# Patient Record
Sex: FEMALE | Race: WHITE | NOT HISPANIC OR LATINO | Employment: FULL TIME | URBAN - METROPOLITAN AREA
[De-identification: names, ages, dates, MRNs, and addresses within clinical notes are randomized per-mention and may not be internally consistent; named-entity substitution may affect disease eponyms.]

---

## 2023-09-04 PROBLEM — R63.2 BINGE EATING: Status: ACTIVE | Noted: 2023-09-04

## 2023-09-04 PROBLEM — E78.2 MIXED HYPERLIPIDEMIA: Status: ACTIVE | Noted: 2023-09-04

## 2023-09-04 PROBLEM — K80.10 CHRONIC CHOLECYSTITIS WITH CALCULUS: Status: ACTIVE | Noted: 2023-09-04

## 2023-09-04 PROBLEM — F32.A DEPRESSION: Status: ACTIVE | Noted: 2023-09-04

## 2023-09-04 PROBLEM — R53.82 CHRONIC FATIGUE: Status: ACTIVE | Noted: 2023-09-04

## 2023-09-04 PROBLEM — E66.01 MORBID OBESITY (MULTI): Status: ACTIVE | Noted: 2023-09-04

## 2023-09-04 PROBLEM — G43.909 MIGRAINE: Status: ACTIVE | Noted: 2023-09-04

## 2023-09-04 PROBLEM — G47.9 SLEEP DISTURBANCE: Status: ACTIVE | Noted: 2023-09-04

## 2023-09-04 PROBLEM — N91.2 AMENORRHEA: Status: ACTIVE | Noted: 2023-09-04

## 2023-09-04 PROBLEM — E78.49 OTHER HYPERLIPIDEMIA: Status: ACTIVE | Noted: 2023-09-04

## 2023-09-04 PROBLEM — N93.0 POSTCOITAL BLEEDING: Status: ACTIVE | Noted: 2023-09-04

## 2023-09-04 PROBLEM — N92.6 IRREGULAR MENSTRUAL CYCLE: Status: ACTIVE | Noted: 2023-09-04

## 2023-09-04 RX ORDER — RIZATRIPTAN BENZOATE 10 MG/1
10 TABLET ORAL
COMMUNITY
Start: 2022-06-13 | End: 2024-05-08 | Stop reason: WASHOUT

## 2023-09-04 RX ORDER — NORETHINDRONE 5 MG/1
5 TABLET ORAL DAILY
COMMUNITY
End: 2024-05-08 | Stop reason: WASHOUT

## 2023-09-04 RX ORDER — LORATADINE 10 MG/1
10 TABLET ORAL DAILY
COMMUNITY

## 2023-09-08 PROBLEM — E78.49 OTHER HYPERLIPIDEMIA: Status: RESOLVED | Noted: 2023-09-04 | Resolved: 2023-09-08

## 2023-12-12 ENCOUNTER — TELEPHONE (OUTPATIENT)
Dept: PRIMARY CARE | Facility: CLINIC | Age: 33
End: 2023-12-12
Payer: COMMERCIAL

## 2023-12-12 DIAGNOSIS — J06.9 ACUTE URI: Primary | ICD-10-CM

## 2023-12-12 DIAGNOSIS — J02.9 ACUTE PHARYNGITIS, UNSPECIFIED ETIOLOGY: ICD-10-CM

## 2023-12-12 RX ORDER — AZITHROMYCIN 250 MG/1
TABLET, FILM COATED ORAL
Qty: 6 TABLET | Refills: 0 | Status: SHIPPED | OUTPATIENT
Start: 2023-12-12 | End: 2023-12-15 | Stop reason: SDUPTHER

## 2023-12-12 NOTE — TELEPHONE ENCOUNTER
Jane pt.  Pt c/o sore throat, runny nose, headache, cough with light green mucus x2 days.  Has tried tylenol and dayquil without relief.  States  was just diagnosed with URI and asking if you could Rx Zpak.  Please advise.  Ph: [phone]

## 2023-12-12 NOTE — TELEPHONE ENCOUNTER
Patient advised to use Mucinex one twice a day with full  glass water, and try Flonase nasal spray as directed, informed patient I would pass along message for possible antibiotic

## 2023-12-13 DIAGNOSIS — J02.9 ACUTE PHARYNGITIS, UNSPECIFIED ETIOLOGY: ICD-10-CM

## 2023-12-13 DIAGNOSIS — J06.9 ACUTE URI: ICD-10-CM

## 2023-12-13 NOTE — TELEPHONE ENCOUNTER
Rx for Zpak was sent to mail order pharm.  Please send to local pharm, [facility] [address] [place] [address].

## 2023-12-15 NOTE — TELEPHONE ENCOUNTER
Pharm cannot take verbal and asking if this can be escribed.  Please send Zpak to [address].    This was originally sent to mail order pharm in error.

## 2023-12-16 RX ORDER — AZITHROMYCIN 250 MG/1
TABLET, FILM COATED ORAL
Qty: 6 TABLET | Refills: 0 | Status: SHIPPED | OUTPATIENT
Start: 2023-12-16 | End: 2023-12-20

## 2023-12-21 ENCOUNTER — APPOINTMENT (OUTPATIENT)
Dept: PRIMARY CARE | Facility: CLINIC | Age: 33
End: 2023-12-21
Payer: COMMERCIAL

## 2024-04-18 ENCOUNTER — APPOINTMENT (OUTPATIENT)
Dept: PRIMARY CARE | Facility: CLINIC | Age: 34
End: 2024-04-18
Payer: COMMERCIAL

## 2024-05-08 ENCOUNTER — LAB (OUTPATIENT)
Dept: LAB | Facility: LAB | Age: 34
End: 2024-05-08
Payer: COMMERCIAL

## 2024-05-08 ENCOUNTER — OFFICE VISIT (OUTPATIENT)
Dept: PRIMARY CARE | Facility: CLINIC | Age: 34
End: 2024-05-08
Payer: COMMERCIAL

## 2024-05-08 ENCOUNTER — DOCUMENTATION (OUTPATIENT)
Dept: SURGERY | Facility: HOSPITAL | Age: 34
End: 2024-05-08

## 2024-05-08 ENCOUNTER — TELEPHONE (OUTPATIENT)
Dept: PRIMARY CARE | Facility: CLINIC | Age: 34
End: 2024-05-08

## 2024-05-08 VITALS
SYSTOLIC BLOOD PRESSURE: 125 MMHG | HEART RATE: 92 BPM | OXYGEN SATURATION: 96 % | DIASTOLIC BLOOD PRESSURE: 86 MMHG | WEIGHT: 293 LBS | HEIGHT: 68 IN | BODY MASS INDEX: 44.41 KG/M2

## 2024-05-08 DIAGNOSIS — E78.2 MIXED HYPERLIPIDEMIA: ICD-10-CM

## 2024-05-08 DIAGNOSIS — E28.2 PCOS (POLYCYSTIC OVARIAN SYNDROME): ICD-10-CM

## 2024-05-08 DIAGNOSIS — E66.01 MORBID OBESITY (MULTI): ICD-10-CM

## 2024-05-08 DIAGNOSIS — R53.82 CHRONIC FATIGUE: ICD-10-CM

## 2024-05-08 DIAGNOSIS — G43.001 MIGRAINE WITHOUT AURA AND WITH STATUS MIGRAINOSUS, NOT INTRACTABLE: ICD-10-CM

## 2024-05-08 DIAGNOSIS — R73.03 PRE-DIABETES: ICD-10-CM

## 2024-05-08 DIAGNOSIS — N92.6 IRREGULAR MENSTRUAL CYCLE: ICD-10-CM

## 2024-05-08 DIAGNOSIS — Z76.89 ENCOUNTER TO ESTABLISH CARE: Primary | ICD-10-CM

## 2024-05-08 DIAGNOSIS — R63.2 BINGE EATING: ICD-10-CM

## 2024-05-08 LAB
25(OH)D3 SERPL-MCNC: 25 NG/ML (ref 30–100)
ALBUMIN SERPL BCP-MCNC: 4.2 G/DL (ref 3.4–5)
ALP SERPL-CCNC: 89 U/L (ref 33–110)
ALT SERPL W P-5'-P-CCNC: 25 U/L (ref 7–45)
ANION GAP SERPL CALC-SCNC: 13 MMOL/L (ref 10–20)
AST SERPL W P-5'-P-CCNC: 15 U/L (ref 9–39)
BASOPHILS # BLD AUTO: 0.05 X10*3/UL (ref 0–0.1)
BASOPHILS NFR BLD AUTO: 0.6 %
BILIRUB SERPL-MCNC: 0.4 MG/DL (ref 0–1.2)
BUN SERPL-MCNC: 14 MG/DL (ref 6–23)
CALCIUM SERPL-MCNC: 9.4 MG/DL (ref 8.6–10.3)
CHLORIDE SERPL-SCNC: 104 MMOL/L (ref 98–107)
CHOLEST SERPL-MCNC: 202 MG/DL (ref 0–199)
CHOLESTEROL/HDL RATIO: 4.8
CO2 SERPL-SCNC: 26 MMOL/L (ref 21–32)
CREAT SERPL-MCNC: 0.65 MG/DL (ref 0.5–1.05)
EGFRCR SERPLBLD CKD-EPI 2021: >90 ML/MIN/1.73M*2
EOSINOPHIL # BLD AUTO: 0.32 X10*3/UL (ref 0–0.7)
EOSINOPHIL NFR BLD AUTO: 4.1 %
ERYTHROCYTE [DISTWIDTH] IN BLOOD BY AUTOMATED COUNT: 13.1 % (ref 11.5–14.5)
EST. AVERAGE GLUCOSE BLD GHB EST-MCNC: 100 MG/DL
GLUCOSE SERPL-MCNC: 116 MG/DL (ref 74–99)
HBA1C MFR BLD: 5.1 %
HCT VFR BLD AUTO: 40 % (ref 36–46)
HDLC SERPL-MCNC: 42.5 MG/DL
HGB BLD-MCNC: 13.2 G/DL (ref 12–16)
IMM GRANULOCYTES # BLD AUTO: 0.02 X10*3/UL (ref 0–0.7)
IMM GRANULOCYTES NFR BLD AUTO: 0.3 % (ref 0–0.9)
IRON SATN MFR SERPL: 16 % (ref 25–45)
IRON SERPL-MCNC: 52 UG/DL (ref 35–150)
LDLC SERPL CALC-MCNC: 141 MG/DL
LYMPHOCYTES # BLD AUTO: 2.16 X10*3/UL (ref 1.2–4.8)
LYMPHOCYTES NFR BLD AUTO: 27.9 %
MAGNESIUM SERPL-MCNC: 1.97 MG/DL (ref 1.6–2.4)
MCH RBC QN AUTO: 28.6 PG (ref 26–34)
MCHC RBC AUTO-ENTMCNC: 33 G/DL (ref 32–36)
MCV RBC AUTO: 87 FL (ref 80–100)
MONOCYTES # BLD AUTO: 0.48 X10*3/UL (ref 0.1–1)
MONOCYTES NFR BLD AUTO: 6.2 %
NEUTROPHILS # BLD AUTO: 4.7 X10*3/UL (ref 1.2–7.7)
NEUTROPHILS NFR BLD AUTO: 60.9 %
NON HDL CHOLESTEROL: 160 MG/DL (ref 0–149)
NRBC BLD-RTO: 0 /100 WBCS (ref 0–0)
PLATELET # BLD AUTO: 374 X10*3/UL (ref 150–450)
POTASSIUM SERPL-SCNC: 4.4 MMOL/L (ref 3.5–5.3)
PROT SERPL-MCNC: 6.7 G/DL (ref 6.4–8.2)
RBC # BLD AUTO: 4.62 X10*6/UL (ref 4–5.2)
SODIUM SERPL-SCNC: 139 MMOL/L (ref 136–145)
T4 FREE SERPL-MCNC: 0.91 NG/DL (ref 0.61–1.12)
TIBC SERPL-MCNC: 335 UG/DL (ref 240–445)
TRIGL SERPL-MCNC: 94 MG/DL (ref 0–149)
TSH SERPL-ACNC: 2.69 MIU/L (ref 0.44–3.98)
UIBC SERPL-MCNC: 283 UG/DL (ref 110–370)
VIT B12 SERPL-MCNC: 328 PG/ML (ref 211–911)
VLDL: 19 MG/DL (ref 0–40)
WBC # BLD AUTO: 7.7 X10*3/UL (ref 4.4–11.3)

## 2024-05-08 PROCEDURE — 84439 ASSAY OF FREE THYROXINE: CPT

## 2024-05-08 PROCEDURE — 99204 OFFICE O/P NEW MOD 45 MIN: CPT | Performed by: NURSE PRACTITIONER

## 2024-05-08 PROCEDURE — 82607 VITAMIN B-12: CPT

## 2024-05-08 PROCEDURE — 83540 ASSAY OF IRON: CPT

## 2024-05-08 PROCEDURE — 80053 COMPREHEN METABOLIC PANEL: CPT

## 2024-05-08 PROCEDURE — 83735 ASSAY OF MAGNESIUM: CPT

## 2024-05-08 PROCEDURE — 80061 LIPID PANEL: CPT

## 2024-05-08 PROCEDURE — 36415 COLL VENOUS BLD VENIPUNCTURE: CPT

## 2024-05-08 PROCEDURE — 84443 ASSAY THYROID STIM HORMONE: CPT

## 2024-05-08 PROCEDURE — 85025 COMPLETE CBC W/AUTO DIFF WBC: CPT

## 2024-05-08 PROCEDURE — 83550 IRON BINDING TEST: CPT

## 2024-05-08 PROCEDURE — 82306 VITAMIN D 25 HYDROXY: CPT

## 2024-05-08 PROCEDURE — 83036 HEMOGLOBIN GLYCOSYLATED A1C: CPT

## 2024-05-08 PROCEDURE — 1036F TOBACCO NON-USER: CPT | Performed by: NURSE PRACTITIONER

## 2024-05-08 RX ORDER — SEMAGLUTIDE 0.25 MG/.5ML
0.25 INJECTION, SOLUTION SUBCUTANEOUS
Qty: 2 ML | Refills: 1 | Status: SHIPPED | OUTPATIENT
Start: 2024-05-12 | End: 2024-06-10 | Stop reason: ALTCHOICE

## 2024-05-08 NOTE — PATIENT INSTRUCTIONS
Call your insurance and ask if they cover GLP-1 for weight loss. If they say yes- ask what your responsibility is  Ozempic, mounjaro, wegovy, zepbound

## 2024-05-08 NOTE — PROGRESS NOTES
"Subjective   Patient ID: Jodi Wesley is a 34 y.o. female who presents for New Patient Visit (Patient is here today to establish care with a new pcp. Patient had a hard time getting her medications ather last pcp. Patient is in the process of trying to get pregnant and has been going through her OB with Fertility. ) and Med Refill.    34 year old female here to establish care. Was a pt of [employer]. Was having a hard time getting refills of RX. Scheduled appt with a new PCP who retired. Then saw Argenis Curran.   Had a physical for health insurance 10/2023  Seeing GYN for fertility.  fertility specialist Janet in July 2024  GYN is Samara Tejeda. Screenings have been done in 2022. PCOS, uterine US, labs, uterine BX  Labs reviewed- pre diabetes, high nml TSH  Weight- fluctuating. Heaviest was 420lb. Then started to lose weight. Has a friend who is a  and was helping with diet, weight loss.    Recent cholecystectomy 2023. Was not able to maintain the weight loss afterward.   HX Migraine- was encouraged to wean and stop the meds for migraine since 12/2024. Not taking anything at this time. Takes tylenol. Migraine occurs 1 time a week. Occasionally vidal have a migraine for 3 days in a row  Has been having a constant \"period\" since February. Some days very heavy. Using a menstrual cup. Has not alerted GYN.  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   ETOH- none  Nicotene- none  Exercise- walk the dogs. Trying elliptical bike. Cannot do more than 10 min at a time.       Med Refill         Review of Systems    Objective   /86   Pulse 92   Ht 1.727 m (5' 8\")   Wt (!) 183 kg (404 lb)   SpO2 96%   BMI 61.43 kg/m²     Physical Exam  Constitutional:       Appearance: She is obese.   HENT:      Head: Normocephalic and atraumatic.   Cardiovascular:      Rate and Rhythm: Normal rate and regular rhythm.      Pulses: Normal pulses.      Heart sounds: Normal " heart sounds.   Pulmonary:      Effort: Pulmonary effort is normal.      Breath sounds: Normal breath sounds.   Abdominal:      Palpations: Abdomen is soft.   Musculoskeletal:         General: Normal range of motion.      Cervical back: Normal range of motion and neck supple.   Skin:     General: Skin is warm and dry.      Comments: Tattoo LT UE   Neurological:      General: No focal deficit present.      Mental Status: She is alert and oriented to person, place, and time.   Psychiatric:         Mood and Affect: Mood normal.         Behavior: Behavior normal.         Assessment/Plan   Diagnoses and all orders for this visit:  Encounter to establish care  Comments:  Update fasting lipids.  I spent a great deal of time educating her on morbid obesity and fertility  Binge eating  Comments:  I think she would benefit from GLP-1 injections however insurance may not pay.  Referral placed for bariatric surgery  Orders:  -     Referral to Bariatric Surgery; Future  Chronic fatigue  Comments:  Multiple possible causes.  Her weight is one of them prediabetes is another elevated blood sugar is also a cause  Orders:  -     Vitamin B12; Future  -     Vitamin D 25-Hydroxy,Total (for eval of Vitamin D levels); Future  -     Thyroid Stimulating Hormone; Future  -     Thyroxine, Free; Future  Irregular menstrual cycle  Comments:  PCOS.  I asked that she call her gynecologist to let her know she has been having it.  Since February that is 3 months  Orders:  -     Iron and TIBC; Future  -     Vitamin B12; Future  -     CBC and Auto Differential; Future  -     Thyroid Stimulating Hormone; Future  -     Thyroxine, Free; Future  Morbid obesity (Multi)  Comments:  Need weight loss.  I discussed risk of ongoing morbid obesity on her body organs.  She is trying to get pregnant losing weight can be very beneficial  Orders:  -     Vitamin B12; Future  -     Vitamin D 25-Hydroxy,Total (for eval of Vitamin D levels); Future  -     Comprehensive  Metabolic Panel; Future  -     Thyroid Stimulating Hormone; Future  -     Thyroxine, Free; Future  -     Referral to Clinical Pharmacy; Future  -     Referral to Bariatric Surgery; Future  Mixed hyperlipidemia  Comments:  Update fasting lipid panel  Orders:  -     Lipid Panel; Future  Migraine without aura and with status migrainosus, not intractable  Comments:  Untreated due to fear of complications with medication side effects and pregnancy if she should conceive.  Orders:  -     Vitamin B12; Future  -     Vitamin D 25-Hydroxy,Total (for eval of Vitamin D levels); Future  -     Magnesium; Future  -     Comprehensive Metabolic Panel; Future  -     Thyroid Stimulating Hormone; Future  -     Thyroxine, Free; Future  Pre-diabetes  Comments:  Update hemoglobin A1c.  I am hoping her insurance covers GLP-1 inhibitors so we can get her on a medication to prevent type 2 diabetes  Orders:  -     Comprehensive Metabolic Panel; Future  -     Hemoglobin A1C; Future  -     Referral to Clinical Pharmacy; Future  -     Referral to Bariatric Surgery; Future  Other orders  -     Follow Up In Primary Care - Established; Future

## 2024-05-08 NOTE — PROGRESS NOTES
Received vcml from pt in the [facility]OR[facility]ERT[facility] box, and she was inquiring on how to get scheduled for a consult for non-surgical and surgical weight loss. NeedFeed message was sent.

## 2024-05-09 ENCOUNTER — APPOINTMENT (OUTPATIENT)
Dept: PRIMARY CARE | Facility: CLINIC | Age: 34
End: 2024-05-09
Payer: COMMERCIAL

## 2024-05-09 ENCOUNTER — TELEPHONE (OUTPATIENT)
Dept: SURGERY | Facility: CLINIC | Age: 34
End: 2024-05-09

## 2024-05-09 ENCOUNTER — TELEMEDICINE (OUTPATIENT)
Dept: PRIMARY CARE | Facility: CLINIC | Age: 34
End: 2024-05-09
Payer: COMMERCIAL

## 2024-05-09 DIAGNOSIS — E53.8 B12 DEFICIENCY: ICD-10-CM

## 2024-05-09 DIAGNOSIS — E55.9 VITAMIN D DEFICIENCY: ICD-10-CM

## 2024-05-09 DIAGNOSIS — E28.2 PCOS (POLYCYSTIC OVARIAN SYNDROME): ICD-10-CM

## 2024-05-09 DIAGNOSIS — E66.01 MORBID OBESITY (MULTI): Primary | ICD-10-CM

## 2024-05-09 DIAGNOSIS — R73.03 PRE-DIABETES: ICD-10-CM

## 2024-05-09 PROCEDURE — 99213 OFFICE O/P EST LOW 20 MIN: CPT | Performed by: NURSE PRACTITIONER

## 2024-05-09 NOTE — PROGRESS NOTES
Subjective   Patient ID: Jodi Wesley is a 34 y.o. female who presents for No chief complaint on file..    This is a virtual visit with video.  Patient has requested and is agreeable to be on video. I performed this visit using real-time telehealth tools; including audio/video connection between the pt. and I at [facility].        This was scheduled in person however she could not make it so we did virtual to educate her on Wegovy. I educated on the drug class, poss SE, mode of action.  No HX pancreatitis- does not drink ETOH.  No personal or family HX medullary thyroid cancer.   On video I showed her where to inject.   We talked about nausea and constipation. Paying attention to healthy foods.   I still want her to meet with Bariatrics. She agrees.  She is encouraged to reach out to me for any questions.          Review of Systems    Objective   There were no vitals taken for this visit.    Physical Exam    Assessment/Plan   Diagnoses and all orders for this visit:  Morbid obesity (Multi)  Comments:  wegovy 0.25mg sq q week x 1 month then see me for eval. keep lowest most effective dose  Pre-diabetes  PCOS (polycystic ovarian syndrome)  B12 deficiency  Comments:  add B12 1000mcg sl qd  Vitamin D deficiency  Comments:  add Vitamin D 3-5,000 iu po daily with food  Other orders  -     Follow Up In Primary Care - Established; Future

## 2024-06-10 ENCOUNTER — OFFICE VISIT (OUTPATIENT)
Dept: PRIMARY CARE | Facility: CLINIC | Age: 34
End: 2024-06-10
Payer: COMMERCIAL

## 2024-06-10 VITALS
HEIGHT: 68 IN | BODY MASS INDEX: 44.41 KG/M2 | WEIGHT: 293 LBS | SYSTOLIC BLOOD PRESSURE: 123 MMHG | DIASTOLIC BLOOD PRESSURE: 84 MMHG | OXYGEN SATURATION: 98 % | HEART RATE: 79 BPM

## 2024-06-10 DIAGNOSIS — E28.2 PCOS (POLYCYSTIC OVARIAN SYNDROME): ICD-10-CM

## 2024-06-10 DIAGNOSIS — R73.03 PRE-DIABETES: ICD-10-CM

## 2024-06-10 DIAGNOSIS — E66.01 MORBID OBESITY (MULTI): Primary | ICD-10-CM

## 2024-06-10 PROCEDURE — 1036F TOBACCO NON-USER: CPT | Performed by: NURSE PRACTITIONER

## 2024-06-10 PROCEDURE — 99213 OFFICE O/P EST LOW 20 MIN: CPT | Performed by: NURSE PRACTITIONER

## 2024-06-10 RX ORDER — SEMAGLUTIDE 0.5 MG/.5ML
0.5 INJECTION, SOLUTION SUBCUTANEOUS
Qty: 2 ML | Refills: 3 | Status: SHIPPED | OUTPATIENT
Start: 2024-06-10

## 2024-06-10 ASSESSMENT — LIFESTYLE VARIABLES
TOBACCO_USE: NO
HISTORY_ALCOHOL_USE: NO

## 2024-06-10 NOTE — PROGRESS NOTES
"Subjective   Patient ID: Jodi Wesley is a 34 y.o. female who presents for Follow-up (Patient is here today for her 1 month follow up. ).    34 year old female here for follow up weight loss on Wegovy, nausea and feel full faster. Has not been weighing herself. Injecting low abdomen. Feel less bloated. Her menstrual bleeding has slowed down when prior to  starting the Wegovy was having consistent bleeding. She is feeling better, her clothes are looser.   Taking Wegovy 0.25 mg. Has 3 more injections for this month  Was in touch with bariatrics- consultation in July   Will be seeing clinical pharmacist this week for follow up  Fertility OV in July         Review of Systems    Objective   /84   Pulse 79   Ht 1.727 m (5' 8\")   Wt (!) 184 kg (406 lb)   SpO2 98%   BMI 61.73 kg/m²     Physical Exam  Constitutional:       Appearance: Normal appearance. She is obese.   HENT:      Head: Normocephalic and atraumatic.   Pulmonary:      Effort: Pulmonary effort is normal.   Musculoskeletal:         General: Normal range of motion.   Neurological:      General: No focal deficit present.      Mental Status: She is alert and oriented to person, place, and time.   Psychiatric:         Mood and Affect: Mood normal.         Behavior: Behavior normal.         Assessment/Plan   Diagnoses and all orders for this visit:  Morbid obesity (Multi)  Comments:  weigh self at home.. incraese the Wegovy to 0.5 mg sq q week  Orders:  -     semaglutide, weight loss, (Wegovy) 0.5 mg/0.5 mL pen injector; Inject 0.5 mg under the skin 1 (one) time per week.  Pre-diabetes  -     semaglutide, weight loss, (Wegovy) 0.5 mg/0.5 mL pen injector; Inject 0.5 mg under the skin 1 (one) time per week.  PCOS (polycystic ovarian syndrome)  Comments:  MS are  becoming more regular  Orders:  -     semaglutide, weight loss, (Wegovy) 0.5 mg/0.5 mL pen injector; Inject 0.5 mg under the skin 1 (one) time per week.  Other orders  -     Follow Up In " Primary Care - Established

## 2024-06-13 ENCOUNTER — APPOINTMENT (OUTPATIENT)
Dept: PHARMACY | Facility: HOSPITAL | Age: 34
End: 2024-06-13
Payer: COMMERCIAL

## 2024-07-18 ENCOUNTER — HOSPITAL ENCOUNTER (OUTPATIENT)
Dept: RADIOLOGY | Facility: EXTERNAL LOCATION | Age: 34
Discharge: HOME | End: 2024-07-18

## 2024-07-18 DIAGNOSIS — M25.571 RIGHT ANKLE PAIN, UNSPECIFIED CHRONICITY: ICD-10-CM

## 2024-07-19 ENCOUNTER — APPOINTMENT (OUTPATIENT)
Dept: SURGERY | Facility: CLINIC | Age: 34
End: 2024-07-19
Payer: COMMERCIAL

## 2024-07-22 ENCOUNTER — APPOINTMENT (OUTPATIENT)
Dept: ORTHOPEDIC SURGERY | Facility: CLINIC | Age: 34
End: 2024-07-22
Payer: COMMERCIAL

## 2024-07-26 ENCOUNTER — TELEPHONE (OUTPATIENT)
Dept: ENDOCRINOLOGY | Facility: CLINIC | Age: 34
End: 2024-07-26

## 2024-07-26 ENCOUNTER — CONSULT (OUTPATIENT)
Dept: ENDOCRINOLOGY | Facility: CLINIC | Age: 34
End: 2024-07-26
Payer: COMMERCIAL

## 2024-07-26 VITALS
BODY MASS INDEX: 41.95 KG/M2 | DIASTOLIC BLOOD PRESSURE: 83 MMHG | WEIGHT: 293 LBS | HEART RATE: 81 BPM | HEIGHT: 70 IN | SYSTOLIC BLOOD PRESSURE: 136 MMHG

## 2024-07-26 DIAGNOSIS — Z83.2 FAMILY HISTORY OF BLEEDING OR CLOTTING DISORDER: ICD-10-CM

## 2024-07-26 DIAGNOSIS — N97.9 FEMALE INFERTILITY: ICD-10-CM

## 2024-07-26 DIAGNOSIS — Z13.71 SCREENING FOR GENETIC DISEASE CARRIER STATUS: ICD-10-CM

## 2024-07-26 DIAGNOSIS — N93.9 ABNORMAL UTERINE BLEEDING: Primary | ICD-10-CM

## 2024-07-26 DIAGNOSIS — N91.3 PRIMARY OLIGOMENORRHEA: ICD-10-CM

## 2024-07-26 DIAGNOSIS — Z31.41 FERTILITY TESTING: ICD-10-CM

## 2024-07-26 DIAGNOSIS — E66.01 MORBID OBESITY WITH BMI OF 50.0-59.9, ADULT (MULTI): ICD-10-CM

## 2024-07-26 DIAGNOSIS — Z11.3 SCREENING FOR STDS (SEXUALLY TRANSMITTED DISEASES): ICD-10-CM

## 2024-07-26 DIAGNOSIS — Z11.59 ENCOUNTER FOR SCREENING FOR OTHER VIRAL DISEASES: ICD-10-CM

## 2024-07-26 DIAGNOSIS — Z13.1 SCREENING FOR DIABETES MELLITUS: ICD-10-CM

## 2024-07-26 DIAGNOSIS — Z13.29 SCREENING FOR THYROID DISORDER: ICD-10-CM

## 2024-07-26 LAB
DHEA-S SERPL-MCNC: 334 UG/DL (ref 12–379)
PROGEST SERPL-MCNC: 0.5 NG/ML
PROLACTIN SERPL-MCNC: 6 UG/L (ref 3–20)
RUBV IGG SERPL IA-ACNC: 2.1 IA
RUBV IGG SERPL QL IA: POSITIVE
VARICELLA ZOSTER IGG INDEX: 5.3 IA
VZV IGG SER QL IA: POSITIVE

## 2024-07-26 PROCEDURE — 86787 VARICELLA-ZOSTER ANTIBODY: CPT

## 2024-07-26 PROCEDURE — 84144 ASSAY OF PROGESTERONE: CPT

## 2024-07-26 PROCEDURE — 83516 IMMUNOASSAY NONANTIBODY: CPT

## 2024-07-26 PROCEDURE — 84146 ASSAY OF PROLACTIN: CPT

## 2024-07-26 PROCEDURE — 86317 IMMUNOASSAY INFECTIOUS AGENT: CPT

## 2024-07-26 PROCEDURE — 36415 COLL VENOUS BLD VENIPUNCTURE: CPT

## 2024-07-26 PROCEDURE — 99215 OFFICE O/P EST HI 40 MIN: CPT | Performed by: OBSTETRICS & GYNECOLOGY

## 2024-07-26 PROCEDURE — 84402 ASSAY OF FREE TESTOSTERONE: CPT

## 2024-07-26 PROCEDURE — 99205 OFFICE O/P NEW HI 60 MIN: CPT | Performed by: OBSTETRICS & GYNECOLOGY

## 2024-07-26 PROCEDURE — 82627 DEHYDROEPIANDROSTERONE: CPT

## 2024-07-26 PROCEDURE — 83498 ASY HYDROXYPROGESTERONE 17-D: CPT

## 2024-07-26 RX ORDER — NORETHINDRONE 5 MG/1
5 TABLET ORAL DAILY
Qty: 90 TABLET | Refills: 3 | Status: SHIPPED | OUTPATIENT
Start: 2024-07-26 | End: 2025-07-26

## 2024-07-26 ASSESSMENT — PAIN SCALES - GENERAL: PAINLEVEL: 0-NO PAIN

## 2024-07-26 ASSESSMENT — COLUMBIA-SUICIDE SEVERITY RATING SCALE - C-SSRS
2. HAVE YOU ACTUALLY HAD ANY THOUGHTS OF KILLING YOURSELF?: NO
6. HAVE YOU EVER DONE ANYTHING, STARTED TO DO ANYTHING, OR PREPARED TO DO ANYTHING TO END YOUR LIFE?: NO
1. IN THE PAST MONTH, HAVE YOU WISHED YOU WERE DEAD OR WISHED YOU COULD GO TO SLEEP AND NOT WAKE UP?: NO

## 2024-07-26 ASSESSMENT — PATIENT HEALTH QUESTIONNAIRE - PHQ9
2. FEELING DOWN, DEPRESSED OR HOPELESS: NOT AT ALL
SUM OF ALL RESPONSES TO PHQ9 QUESTIONS 1 AND 2: 0
1. LITTLE INTEREST OR PLEASURE IN DOING THINGS: NOT AT ALL

## 2024-07-26 NOTE — PROGRESS NOTES
NEW FERTILITY PATIENT VISIT    Referred by: Dr. Tejeda  Accompanied today by: spouse      Jodi Wesley is a 34 y.o.  female who presents with   Infertility      Relationship Status:    x 2 years  Together x 8 years    Partner has azoospermia following treatment for cancer- does have some sperm stored at [facility]  Currently on Testosterone managed by Dr. WALKER    Patient has PCOS diagnosed 2 years ago based on oligomenorrhea/abnormal bleeding, clinical hirsutism    -Did have negative endometrial biopsy in 2022    However, currently has had persistent bleeding since 2024  Not currently on any hormonal management  Has been off of OCP x 1 year (2023)    OB Hx     OB History          0    Para   0    Term   0       0    AB   0    Living   0         SAB   0    IAB   0    Ectopic   0    Multiple   0    Live Births   0                 MENSTRUAL HISTORY:   Menarche:  14  Contraception:  OCPs, none currently  -Did try Nexplanon  Cycle length:  Irregular (regular in college)  Bleeding length: prolonged   Flow :  Average  and Heavy   Dysmenorrhea: Yes- mild    ENDOCRINE HISTORY  Nipple Discharge: No  Vision changes: No  Headaches: Yes- chronic migraines  Excess hair growth: Yes  -Chin  Acne: Yes  Oily skin:No  Recent weight change: Yes  -Started gaining weight at age 20, gained ~ 100 lbs in 2 years  -Has steadily gained weight since then  -Working on weight loss - Taking Wegovy x 3 months  -Lost ~ 10 lbs so far  Significant exercise history: No  History of eating disorder: No    PRIOR EVALUATION / TREATMENT  Endometrial biopsy   Prior Labs     Latest Reference Range & Units Most Recent   Free Thyroxine Index 1.1 - 4.2 UG/DL 2.7  18 13:43   FOLLICLE STIMULATING HORMONE MU/ML 5.6  19 10:45   Hemoglobin A1C see below % 5.1  24 10:20   Thyroxine, Free 0.61 - 1.12 ng/dL 0.91  24 10:20   T3 Uptake 25 - 35 % 26.6  18 13:43   PROLACTIN 4.8 - 23.3 NG/ML 14.3  19  10:45   Thyroid Stimulating Hormone 0.44 - 3.98 mIU/L 2.69  5/8/24 10:20   Vitamin D, 25-Hydroxy, Total 30 - 100 ng/mL 25 (L)  5/8/24 10:20   DHEA Sulfate  349.2  Reference range: 98.8 to 340.0  Unit: ug/dL    Reference ranges are age and gender specific. For additional   information, reference range tables can be found in the   laboratory test directory.  The normal values are based on the following source:   Dehydroepiandrosterone sulfate (DHEA S) [package insert V   17.0 English]. [facility], [place], [place]: August 2013.  Performed at the [facility] unless   otherwise noted.   (H)  11/12/19 10:45   Thyroxine 4.5 - 12.0 UG/DL 10.3  5/2/18 13:43   GLUCOSE 74 - 99 mg/dL 116 (H)  5/8/24 10:20   Estimated Average Glucose Not Established mg/dL 100  5/8/24 10:20   Is the patient fasting?  YES  Performed at [facility],[address] [facility] [address] [address]    12/15/22 09:19   (L): Data is abnormally low  (H): Data is abnormally high    Hysterosalpingogram: None  Saline Infused Sonography: None  GYN Pelvic Ultrasound: None    Prior Treatment:   None  On Wegovy for weight loss     GYN HISTORY    History of STD or PID: No  LMP: Patient's last menstrual period was 02/11/2024.  Last pap smear:  2022 negative  History of abnormal paps: No  History of abnormal mammogram: No  Date of last Mammogram :  N/A  Coitus: varies  x/fertile week    Pain with intercourse, bowel movements or full bladder: No     Pelvic pain: No    PMH  Past Medical History:   Diagnosis Date    Polycystic ovary syndrome    -Obesity, BMI=57     MEDICATIONS  Current Outpatient Medications on File Prior to Visit   Medication Sig Dispense Refill    loratadine (Claritin) 10 mg tablet Take 1 tablet (10 mg) by mouth once daily.      MULTIVITAMIN ORAL Take 1 tablet by mouth once daily.      semaglutide, weight loss, (Wegovy) 0.5 mg/0.5 mL pen injector Inject 0.5 mg under the skin 1 (one) time per week. 2 mL 3     No current  facility-administered medications on file prior to visit.       PSH  Past Surgical History:   Procedure Laterality Date    GALLBLADDER SURGERY  2023        PSYCH HISTORY  Past psych history: Yes Depression, bipolar II, anxiety  Prior hospitalization for mental health disorder: No     SOCIAL HISTORY  Occupation: [employer]  Social History     Tobacco Use    Smoking status: Never     Passive exposure: Never    Smokeless tobacco: Never   Substance Use Topics    Alcohol use: Not Currently     Comment: Stopped in October    Drug use: Never     History of incarceration: No  History of domestic violence: No  History of  incest or rape: No      FAMILY HISTORY   Family History   Problem Relation Name Age of Onset    Thyroid disease Mother      Blood clot Mother      Thyroid disease Paternal Grandmother       Family History of Blood Clots: Yes - Mother had DVT- thought to be hereditary, also may have been related to HRT  Family history of breast, ovary, colon, endometrial cancer: No    PARTNER HISTORY    Partner: Name: Yong Wesley  : 1990  Occupation: [employer]  Prior fertility history: none  PMH: Non-Hodgkin's Lymphoma, 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    ASSESSMENT   34 y.o.  female with  primary infertility x 2 years, suspected oligoovulation and the following pertinent medical issues: Obesity with BMI of 57, abnormal uterine bleeding with constant bleeding x 6 months, negative biopsy in .  PCOS based on oligomenorrhea and clinical hirsutism  Partner SA: Azoospermia- s/p cancer treatment and BMT    COUNSELING  We discussed causes of infertility including hormonal, egg quality issues, structural problems such as endometriosis, adhesions, or tubal problems, uterine factors such as polyps or fibroids, and sperm issues. Reviewed evaluation of such as well. We discussed various methods for achieving pregnancy in some detail including, ovulation induction, insemination, superovulation and IVF.    We discussed diagnosis of PCOS and implications for fertility and long-term health including risks of endometrial hyperplasia, obesity, diabetes and cardiovascular disease. Discussed diet and exercise are first-line treatments for PCOS. Medical management may be indicated, particularly for glucose intolerance if that is found in testing. Ovulation induction is the primary treatment for fertility.  Discussed the importance of diet and exercise as first-line treatments for PCOS and particularly the importance of a low-glycemic index diet that emphasizes whole grains, vegetables and fruits, and protein sources while minimizing sugar and processed foods. Discussed that even a small amount of weight loss can have an effect on the symptoms of PCOS and is important for long-term health outcomes.      We discussed the impact of obesity on fertility and pregnancy outcomes, including increased risk of miscarriage, gestational diabetes, preeclampsia, IUGR, and still birth.  We discussed the importance of weight loss for " optimizing fertility and pregnancy outcomes.       Discussed best option for conception would be IVF given partner's azoospermia and 1 vial of frozen sperm.  Discussed donor sperm as an option.  Discussed main christian to IVF currently is patient's weight- will need significant weight loss to achieve BMI <45 and recommend patient consider bariatric surgery- she has visit scheduled.    Discussed IVF as option and briefly reviewed what IVF involves (i.e. use of fertility drugs, egg retrieval, embryo transfer) and discussed IVF is treatment as well as diagnostic testing.    Discussed need for evaluation of patient's abnormal uterine bleeding and options for bleeding control- progesterone-only pill vs. IUD would be best options.  Has family history of blood clots- has never been assessed, does not know specific gene mutation.     PLAN  Orders Placed This Encounter   Procedures    Endometrial biopsy    US pelvis transvaginal    Antimullerian Hormone (Amh)    Prolactin    Testosterone,Free and Total    Dhea-Sulfate    17-Hydroxyprogesterone    Progesterone    Myriad Foresight Carrier Screen    Varicella Zoster Antibody, Igg    Rubella Antibody, Igg    Factor V Leiden       GENETIC SCREENING PATIENT  Ordered    PARTNER  Yes Semen Analysis: Completed previous  Yes Genetic screening: Ordered    FOLLOW UP   Consults:  Patient will continue to work with MD for medical weight loss, has consult to discuss bariatric surgery  Chart to primary nurse for care coordination and patient check list/education  Enroll in Engaged MD  Take prenatal vitamins, vitamin D 2000 IUs daily  Discussed that PAP and mammogram must be updated if appropriate based on age and clinical history and results received before treatment can begin  Discussed that treatment cannot proceed until checklist items are complete   6 week follow up with MD  Additional testing for BMI < 18 or > 40:  Will check later when closer to fertility treatment initiation  Sperm  Donor:  Will defer currently- couple would like to use partner sperm for IVF first.     Plan:  1.Schedule TVUS and endometrial biopsy- okay to do while bleeding  2. Start Aygestin 5 mg daily after biopsy for bleeding control--can consider Slynd in the future, not currently covered by her insurance  3. Continue Wegovy and keep visit with bariatric surgery to plan weight loss options  4. Check Factor V leiden given family history  5. Follow up partner's sperm freeze records, he can continue management with Dr. WALKER  6. RTC in 6 weeks to review PCOS management while attempting weight loss  7. Defer additional preconception workup (including STDS and MFM consult) pending weight loss and will get closer to actual treatment time  8. Will need IVF consult to discuss IVF in more detail- schedule in ~ 1 year      Ashley Gonzales MD

## 2024-07-26 NOTE — TELEPHONE ENCOUNTER
Returned patient call regarding start of Aygestin. Patient instructed she is to start Aygestin after her EMB per note from today from Dr. Gonzales. Patient inquiring when she should have EMB and TVUS. Patient instructed schedule both today as she is okay per note to schedule while bleeding. Patient inquiring about use of numbing cream for procedure d/t pain with previous procedure. Patient instructed to send my chart message inquiring about cream and stating when EMB and TVUS are scheduled. Patient agreeable and denies further questions/concerns. Patient transferred to front to schedule appointment.   NANCY JHA on 7/26/24 at 3:42 PM.

## 2024-07-26 NOTE — TELEPHONE ENCOUNTER
Attempted to return patient call. Detailed VM left for patient. Patient instructed to call office to discuss questions/concerns.   NANCY JHA on 7/26/24 at 2:35 PM.

## 2024-07-26 NOTE — PATIENT INSTRUCTIONS
ASSESSMENT   34 y.o.  female with  primary infertility x 2 years, suspected oligoovulation and the following pertinent medical issues: Obesity with BMI of 57, abnormal uterine bleeding with constant bleeding x 6 months, negative biopsy in .  Partner SA: Azoospermia- s/p cancer treatment and BMT    COUNSELING  We discussed causes of infertility including hormonal, egg quality issues, structural problems such as endometriosis, adhesions, or tubal problems, uterine factors such as polyps or fibroids, and sperm issues. Reviewed evaluation of such as well. We discussed various methods for achieving pregnancy in some detail including, ovulation induction, insemination, superovulation and IVF.    We discussed diagnosis of PCOS and implications for fertility and long-term health including risks of endometrial hyperplasia, obesity, diabetes and cardiovascular disease. Discussed diet and exercise are first-line treatments for PCOS. Medical management may be indicated, particularly for glucose intolerance if that is found in testing. Ovulation induction is the primary treatment for fertility.  Discussed the importance of diet and exercise as first-line treatments for PCOS and particularly the importance of a low-glycemic index diet that emphasizes whole grains, vegetables and fruits, and protein sources while minimizing sugar and processed foods. Discussed that even a small amount of weight loss can have an effect on the symptoms of PCOS and is important for long-term health outcomes.      We discussed the impact of obesity on fertility and pregnancy outcomes, including increased risk of miscarriage, gestational diabetes, preeclampsia, IUGR, and still birth.  We discussed the importance of weight loss for optimizing fertility and pregnancy outcomes.       Discussed best option for conception would be IVF given partner's azoospermia and 1 vial of frozen sperm.  Discussed donor egg as an option.  Discussed main christian  to IVF currently is patient's weight- will need significant weight loss to achieve BMI <45 and recommend patient consider bariatric surgery- she has visit scheduled.    Discussed IVF as option and briefly reviewed what IVF involves (i.e. use of fertility drugs, egg retrieval, embryo transfer) and discussed IVF is treatment as well as diagnostic testing.      PLAN  Orders Placed This Encounter   Procedures    Endometrial biopsy    US pelvis transvaginal    Antimullerian Hormone (Amh)    Prolactin    Testosterone,Free and Total    Dhea-Sulfate    17-Hydroxyprogesterone    Progesterone    Myriad Foresight Carrier Screen    Varicella Zoster Antibody, Igg    Rubella Antibody, Igg    Factor V Leiden       GENETIC SCREENING PATIENT  Ordered    PARTNER  Yes Semen Analysis: Completed previous  Yes Genetic screening: Ordered    FOLLOW UP   Consults: Patient will continue to work with MD for medical weight loss, has consult to discuss bariatric surgery  Chart to primary nurse for care coordination and patient check list/education  Enroll in Engaged MD  Take prenatal vitamins, vitamin D 2000 IUs daily  Discussed that PAP and mammogram must be updated if appropriate based on age and clinical history and results received before treatment can begin  Discussed that treatment cannot proceed until checklist items are complete   6 week follow up with MD  Additional testing for BMI < 18 or > 40: Will check later when closer to fertility treatment initiation  Sperm Donor:  Will defer currently- couple would like to use partner sperm for IVF first.     Plan:  1.Schedule TVUS and endometrial biopsy- okay to do while bleeding  2. Start Aygestin 5 mg daily after biopsy for bleeding control  3. Continue Wegovy and keep visit with bariatric surgery to plan weight loss options  4. Check Factor V leiden given family history  5. Follow up partner's sperm freeze records, he can continue management with Dr. AARON Oquendo [facility] in 6 weeks to review PCOS  management while attempting weight loss  7. Defer additional preconception workup (including STDS and MFM consult) pending weight loss and will get closer to actual treatment time  8. Will need IVF consult to discuss IVF in more detail- schedule in ~ 1 year      Ashley Gonzales MD

## 2024-07-30 LAB
MIS SERPL-MCNC: 13.19 NG/ML (ref 0.18–11.71)
TESTOSTERONE FREE (CHAN): 6 PG/ML (ref 0.1–6.4)
TESTOSTERONE,TOTAL,LC-MS/MS: 38 NG/DL (ref 2–45)

## 2024-08-01 LAB — 17OHP SERPL-MCNC: 16.44 NG/DL

## 2024-08-05 LAB
ELECTRONICALLY SIGNED BY: NORMAL
FACTOR V LEIDEN INTERPRETATION: NORMAL
FACTOR V LEIDEN RESULT: NORMAL

## 2024-08-07 ENCOUNTER — PREP FOR PROCEDURE (OUTPATIENT)
Dept: ENDOCRINOLOGY | Facility: CLINIC | Age: 34
End: 2024-08-07

## 2024-08-07 ENCOUNTER — ANCILLARY PROCEDURE (OUTPATIENT)
Dept: ENDOCRINOLOGY | Facility: CLINIC | Age: 34
End: 2024-08-07
Payer: COMMERCIAL

## 2024-08-07 ENCOUNTER — APPOINTMENT (OUTPATIENT)
Dept: ENDOCRINOLOGY | Facility: CLINIC | Age: 34
End: 2024-08-07
Payer: COMMERCIAL

## 2024-08-07 ENCOUNTER — HOSPITAL ENCOUNTER (OUTPATIENT)
Dept: ENDOCRINOLOGY | Facility: CLINIC | Age: 34
Discharge: HOME | End: 2024-08-07
Payer: COMMERCIAL

## 2024-08-07 VITALS
OXYGEN SATURATION: 96 % | SYSTOLIC BLOOD PRESSURE: 134 MMHG | DIASTOLIC BLOOD PRESSURE: 75 MMHG | TEMPERATURE: 97.7 F | HEART RATE: 73 BPM | WEIGHT: 293 LBS | RESPIRATION RATE: 16 BRPM | BODY MASS INDEX: 41.95 KG/M2 | HEIGHT: 70 IN

## 2024-08-07 DIAGNOSIS — N93.9 ABNORMAL UTERINE BLEEDING (AUB): Primary | ICD-10-CM

## 2024-08-07 DIAGNOSIS — N93.9 ABNORMAL UTERINE BLEEDING: ICD-10-CM

## 2024-08-07 LAB — PREGNANCY TEST URINE, POC: NEGATIVE

## 2024-08-07 PROCEDURE — 64435 NJX AA&/STRD PARACRV NRV: CPT | Performed by: OBSTETRICS & GYNECOLOGY

## 2024-08-07 PROCEDURE — 76830 TRANSVAGINAL US NON-OB: CPT

## 2024-08-07 PROCEDURE — 7100000010 HC PHASE TWO TIME - EACH INCREMENTAL 1 MINUTE: Performed by: OBSTETRICS & GYNECOLOGY

## 2024-08-07 PROCEDURE — 58100 BIOPSY OF UTERUS LINING: CPT | Performed by: OBSTETRICS & GYNECOLOGY

## 2024-08-07 PROCEDURE — 81025 URINE PREGNANCY TEST: CPT | Performed by: STUDENT IN AN ORGANIZED HEALTH CARE EDUCATION/TRAINING PROGRAM

## 2024-08-07 PROCEDURE — 7100000009 HC PHASE TWO TIME - INITIAL BASE CHARGE: Performed by: OBSTETRICS & GYNECOLOGY

## 2024-08-07 PROCEDURE — 76830 TRANSVAGINAL US NON-OB: CPT | Performed by: OBSTETRICS & GYNECOLOGY

## 2024-08-07 RX ORDER — ACETAMINOPHEN 325 MG/1
650 TABLET ORAL ONCE AS NEEDED
Status: DISCONTINUED | OUTPATIENT
Start: 2024-08-07 | End: 2024-08-08 | Stop reason: HOSPADM

## 2024-08-07 RX ORDER — OXYCODONE AND ACETAMINOPHEN 5; 325 MG/1; MG/1
1 TABLET ORAL EVERY 6 HOURS PRN
Status: DISCONTINUED | OUTPATIENT
Start: 2024-08-07 | End: 2024-08-08 | Stop reason: HOSPADM

## 2024-08-07 RX ORDER — ACETAMINOPHEN 325 MG/1
650 TABLET ORAL ONCE AS NEEDED
Status: CANCELLED | OUTPATIENT
Start: 2024-08-07

## 2024-08-07 RX ORDER — OXYCODONE AND ACETAMINOPHEN 5; 325 MG/1; MG/1
1 TABLET ORAL EVERY 6 HOURS PRN
Status: CANCELLED | OUTPATIENT
Start: 2024-08-07

## 2024-08-07 RX ORDER — ONDANSETRON HYDROCHLORIDE 2 MG/ML
4 INJECTION, SOLUTION INTRAVENOUS AS NEEDED
Status: DISCONTINUED | OUTPATIENT
Start: 2024-08-07 | End: 2024-08-08 | Stop reason: HOSPADM

## 2024-08-07 RX ORDER — ONDANSETRON HYDROCHLORIDE 2 MG/ML
4 INJECTION, SOLUTION INTRAVENOUS AS NEEDED
Status: CANCELLED | OUTPATIENT
Start: 2024-08-07

## 2024-08-07 ASSESSMENT — PAIN SCALES - GENERAL: PAINLEVEL_OUTOF10: 0 - NO PAIN

## 2024-08-07 ASSESSMENT — PAIN - FUNCTIONAL ASSESSMENT: PAIN_FUNCTIONAL_ASSESSMENT: 0-10

## 2024-08-07 NOTE — PROGRESS NOTES
Patient ID: Jodi Wesley is a 34 y.o. female.      Date/Time: 8/7/2024 11:44 AM    Performed by: Susan Gallardo MD  Authorized by: Susan Gallardo MD    Consent:     Consent obtained:  Written and verbal    Consent given by:  Patient    Risks, benefits, and alternatives were discussed: yes      Risks discussed:  Bleeding, infection and pain    Alternatives discussed:  No treatment  Universal protocol:     Procedure explained and questions answered to patient or proxy's satisfaction: yes      Relevant documents present and verified: yes      Test results available: yes      Imaging studies available: yes      Immediately prior to procedure, a time out was called: yes      Patient identity confirmed:  Verbally with patient and arm band  Pre-procedure details:     Skin preparation:  Povidone-iodine    Preparation: Patient was prepped and draped in the usual sterile fashion    Procedure specific details:      Preoperative Diagnosis: ABNORMAL UTERINE BLEEDING  Postoperative Diagnosis: Same  Assistant: NONE       Procedure: endometrial biopsy    Discussed risks, benefits, alternatives and potential complications of surgical management. Written Informed Consent was obtained prior to the procedure and is detailed in the patient's record. Urine pregnancy test was performed and was negative. Time out was performed.    Anesthesia: None  Paracervical block with 1% lidocaine: Yes  Tenaculum: Yes  Cervical dilation: No  Estimated Blood Loss: 5 cc  Specimens: endometrial biopsy    Findings: significant amount of endometrial tissue and blood      PROCEDURE DETAILS:   Patient was taken to the operating room.  She was prepped and draped in the usual sterile fashion in dorsal lithotomy position. A speculum was placed within the vaginal canal and the cervix was visualized. Betadine swabs x 3 were used to cleanse the cervix. A single tooth tenaculum was placed on the anterior lip of the cervix after lidocaine injection. Two  passes were performed with the Pipelle. Tenaculum and speculum were removed. Patient tolerated the procedure well and was returned to the recovery room in stable condition.  All counts were correct x 2. The attending physician was present for the entire procedure.  Susan Gallardo 08/07/24 11:44 AM          Post-procedure details:     Procedure completion:  Tolerated well, no immediate complications  Susan Gallardo 08/07/24 11:46 AM

## 2024-08-08 ENCOUNTER — APPOINTMENT (OUTPATIENT)
Dept: PRIMARY CARE | Facility: CLINIC | Age: 34
End: 2024-08-08
Payer: COMMERCIAL

## 2024-08-08 VITALS
OXYGEN SATURATION: 96 % | HEIGHT: 70 IN | SYSTOLIC BLOOD PRESSURE: 124 MMHG | WEIGHT: 293 LBS | BODY MASS INDEX: 41.95 KG/M2 | DIASTOLIC BLOOD PRESSURE: 84 MMHG | HEART RATE: 79 BPM

## 2024-08-08 DIAGNOSIS — E28.2 PCOS (POLYCYSTIC OVARIAN SYNDROME): ICD-10-CM

## 2024-08-08 DIAGNOSIS — G43.001 MIGRAINE WITHOUT AURA AND WITH STATUS MIGRAINOSUS, NOT INTRACTABLE: ICD-10-CM

## 2024-08-08 DIAGNOSIS — E66.01 MORBID OBESITY (MULTI): Primary | ICD-10-CM

## 2024-08-08 DIAGNOSIS — R73.03 PRE-DIABETES: ICD-10-CM

## 2024-08-08 PROCEDURE — 3008F BODY MASS INDEX DOCD: CPT | Performed by: NURSE PRACTITIONER

## 2024-08-08 PROCEDURE — 99214 OFFICE O/P EST MOD 30 MIN: CPT | Performed by: NURSE PRACTITIONER

## 2024-08-08 RX ORDER — SEMAGLUTIDE 1 MG/.5ML
1 INJECTION, SOLUTION SUBCUTANEOUS
Qty: 2 ML | Refills: 0 | Status: SHIPPED | OUTPATIENT
Start: 2024-08-11 | End: 2024-08-08 | Stop reason: SDUPTHER

## 2024-08-08 RX ORDER — SEMAGLUTIDE 1 MG/.5ML
1 INJECTION, SOLUTION SUBCUTANEOUS
Qty: 2 ML | Refills: 2 | Status: SHIPPED | OUTPATIENT
Start: 2024-08-11

## 2024-08-08 NOTE — PROGRESS NOTES
"Subjective   Patient ID: Jodi Wesley is a 34 y.o. female who presents for Follow-up (Patient is here today for a follow up. Patient would like to talk about a referral for a new pcp in [place]. ).    34 year old female here for follow up weight loss. She is on Wegovy 0.5 mg. Lost 11 lb this far. She started in May. Feels full faster.   No constipation, diarrhea, N&V. No abd pain.   Saw fertility and started on BCP. They also rec bariatrics eval. Will poss start the process in approx 1 year  She is moving to [place] 9/10/24. Will be transferring her care to a new PCP in [place]  Migraines- ok to take meds at this time, she is on oral BCP. At this time, tylenol can help. Has tried OTC Excedrin, Propranolol every day, Imitrex-stopped due to \"severe neck, shoulder and back pain\", nausea; Maxalt was workign but was told to stop since trying to conceive.  Asking for a RX for breakthrough Migraines         Review of Systems    Objective   /84   Pulse 79   Ht 1.778 m (5' 10\")   Wt (!) 179 kg (394 lb)   LMP  (LMP Unknown) Comment: On and off randomly since February  SpO2 96%   BMI 56.53 kg/m²     Physical Exam  Constitutional:       Appearance: Normal appearance. She is obese.   HENT:      Head: Normocephalic and atraumatic.   Cardiovascular:      Rate and Rhythm: Normal rate and regular rhythm.      Pulses: Normal pulses.   Pulmonary:      Effort: Pulmonary effort is normal.      Breath sounds: Normal breath sounds.   Neurological:      General: No focal deficit present.      Mental Status: She is alert and oriented to person, place, and time. Mental status is at baseline.   Psychiatric:         Mood and Affect: Mood normal.         Behavior: Behavior normal.         Thought Content: Thought content normal.         Assessment/Plan   Diagnoses and all orders for this visit:  Morbid obesity (Multi)  -     semaglutide, weight loss, (Wegovy) 1 mg/0.5 mL pen injector; Inject 1 mg under the skin 1 (one) time " per week.  Pre-diabetes  -     semaglutide, weight loss, (Wegovy) 1 mg/0.5 mL pen injector; Inject 1 mg under the skin 1 (one) time per week.  PCOS (polycystic ovarian syndrome)  -     semaglutide, weight loss, (Wegovy) 1 mg/0.5 mL pen injector; Inject 1 mg under the skin 1 (one) time per week.  Migraine without aura and with status migrainosus, not intractable  -     ubrogepant (Ubrelvy) 100 mg tablet tablet; Take 1 tablet (100 mg) by mouth if needed (migraine).  Other orders  -     Follow Up In Primary Care - Established    She will be moving in 1 month. She will be re-establishing with a new PCP. I wrote RF for 3 months so she has time to find a new PCP.     I discussed the poss SE of increased dose Wegovy. Poss N&V, constipation and she needs to notify me or new PCP once established ASAP or go to the ED. I also reviewed the poss SE pancreatitis given her BMI. She voiced her understanding and will go to the ED of abd pain N&V

## 2024-08-11 ENCOUNTER — HOSPITAL ENCOUNTER (OUTPATIENT)
Dept: RADIOLOGY | Facility: HOSPITAL | Age: 34
Discharge: HOME | End: 2024-08-11
Payer: COMMERCIAL

## 2024-08-11 DIAGNOSIS — M25.571 PAIN IN RIGHT ANKLE AND JOINTS OF RIGHT FOOT: ICD-10-CM

## 2024-08-11 PROCEDURE — 73721 MRI JNT OF LWR EXTRE W/O DYE: CPT | Mod: RT

## 2024-08-11 PROCEDURE — 73721 MRI JNT OF LWR EXTRE W/O DYE: CPT | Mod: RIGHT SIDE | Performed by: RADIOLOGY

## 2024-08-13 ENCOUNTER — APPOINTMENT (OUTPATIENT)
Dept: RADIOLOGY | Facility: HOSPITAL | Age: 34
End: 2024-08-13
Payer: COMMERCIAL

## 2024-08-14 ENCOUNTER — TELEPHONE (OUTPATIENT)
Dept: PRIMARY CARE | Facility: CLINIC | Age: 34
End: 2024-08-14
Payer: COMMERCIAL

## 2024-08-14 DIAGNOSIS — E66.01 MORBID OBESITY (MULTI): ICD-10-CM

## 2024-08-14 DIAGNOSIS — G43.001 MIGRAINE WITHOUT AURA AND WITH STATUS MIGRAINOSUS, NOT INTRACTABLE: ICD-10-CM

## 2024-08-14 DIAGNOSIS — E28.2 PCOS (POLYCYSTIC OVARIAN SYNDROME): ICD-10-CM

## 2024-08-14 DIAGNOSIS — R73.03 PRE-DIABETES: ICD-10-CM

## 2024-08-14 LAB
LABORATORY COMMENT REPORT: NORMAL
PATH REPORT.FINAL DX SPEC: NORMAL
PATH REPORT.GROSS SPEC: NORMAL
PATH REPORT.RELEVANT HX SPEC: NORMAL
PATH REPORT.TOTAL CANCER: NORMAL

## 2024-08-14 RX ORDER — SEMAGLUTIDE 1 MG/.5ML
1 INJECTION, SOLUTION SUBCUTANEOUS
Qty: 2 ML | Refills: 2 | Status: SHIPPED | OUTPATIENT
Start: 2024-08-18 | End: 2024-08-14 | Stop reason: SDUPTHER

## 2024-08-15 ENCOUNTER — TELEPHONE (OUTPATIENT)
Dept: ENDOCRINOLOGY | Facility: CLINIC | Age: 34
End: 2024-08-15
Payer: COMMERCIAL

## 2024-08-15 RX ORDER — SEMAGLUTIDE 1 MG/.5ML
1 INJECTION, SOLUTION SUBCUTANEOUS
Qty: 2 ML | Refills: 2 | Status: SHIPPED | OUTPATIENT
Start: 2024-08-18

## 2024-08-15 NOTE — TELEPHONE ENCOUNTER
Called to review endometrial biopsy result-      A. Endometrium, curettage:  --Disordered proliferative pattern endometrium with focal hyperplasia and metaplastic changes     Note: No significant cytologic changes reaching the level of atypia is appreciated.    Discussed diagnosis of endometrial hyperplasia and need for progestin treatment.  She does not want Mirena IUD at this time    She is currently taking Aygestin 5 mg daily (started day of biopsy) and is doing well on this- will continue.  Recommend repeat biopsy in 3 months.  If desires she can do at our office (will again need lidocaine paracervical block).  Otherwise can do with regular OB/GYN (needs to establish in [place] as she is moving)    Ashley Gonzales 08/15/24 4:42 PM

## 2024-08-23 ENCOUNTER — APPOINTMENT (OUTPATIENT)
Dept: SURGERY | Facility: CLINIC | Age: 34
End: 2024-08-23
Payer: COMMERCIAL

## 2024-09-05 ENCOUNTER — TELEPHONE (OUTPATIENT)
Dept: PRIMARY CARE | Facility: CLINIC | Age: 34
End: 2024-09-05
Payer: COMMERCIAL

## 2024-09-05 ENCOUNTER — TELEPHONE (OUTPATIENT)
Dept: ENDOCRINOLOGY | Facility: CLINIC | Age: 34
End: 2024-09-05
Payer: COMMERCIAL

## 2024-09-05 DIAGNOSIS — E28.2 PCOS (POLYCYSTIC OVARIAN SYNDROME): ICD-10-CM

## 2024-09-05 DIAGNOSIS — E66.01 MORBID OBESITY (MULTI): Primary | ICD-10-CM

## 2024-09-05 DIAGNOSIS — R73.03 PRE-DIABETES: ICD-10-CM

## 2024-09-05 RX ORDER — SEMAGLUTIDE 0.5 MG/.5ML
0.5 INJECTION, SOLUTION SUBCUTANEOUS WEEKLY
Qty: 2 ML | Refills: 3 | Status: SHIPPED | OUTPATIENT
Start: 2024-09-05

## 2024-09-05 NOTE — TELEPHONE ENCOUNTER
Returned patent call regarding still bleeding after endo bx.  I reviewed with Dr. Rangel before calling patient and Dr. Rangel stated she can take 2 tablets of the Aygestin and if bleeding improves after a week patient can go back down to 1 tablet. Patient stated her bleeding stop 3 days after the biopsy.  Patient stated when they increased her Wegovy dose from 0.5mg to 1mg  she noticed the bleeding came the same day of injection and been bleeding and cramping ever since. Patient stated her PCP said the bleeding could happen after increasing the dose.  Patient stated she's going to see if PCP can decrease dose back to original dose to see if any difference.  I informed patient to reach back out to us if the bleeding/cramping worsen after decreasing the Wegovy dose.   Chitra Lundberg 09/05/24 3:41 PM

## 2024-09-05 NOTE — TELEPHONE ENCOUNTER
Reason for call: Patient is calling to talk to Dr Gonzales about her bleeding after having a biopsy. She is also not sure if Wegovy Rx dose increase is causing the bleed. Please call her.  Notes:

## 2024-09-12 ENCOUNTER — APPOINTMENT (OUTPATIENT)
Dept: ENDOCRINOLOGY | Facility: CLINIC | Age: 34
End: 2024-09-12
Payer: COMMERCIAL

## 2024-09-19 ENCOUNTER — TELEPHONE (OUTPATIENT)
Dept: ENDOCRINOLOGY | Facility: CLINIC | Age: 34
End: 2024-09-19

## 2024-10-01 ENCOUNTER — TELEPHONE (OUTPATIENT)
Dept: ENDOCRINOLOGY | Facility: CLINIC | Age: 34
End: 2024-10-01
Payer: COMMERCIAL

## 2024-10-01 DIAGNOSIS — N93.9 ABNORMAL UTERINE BLEEDING: ICD-10-CM

## 2024-10-01 RX ORDER — NORETHINDRONE 5 MG/1
5 TABLET ORAL DAILY
Qty: 90 TABLET | Refills: 3 | Status: SHIPPED | OUTPATIENT
Start: 2024-10-01 | End: 2025-10-01

## 2024-10-01 NOTE — TELEPHONE ENCOUNTER
Caller: Jodi  Reason for call: Medication request  Medication requested: Aygestin refill to be sent to a new pharmacy [address]

## 2024-10-02 NOTE — TELEPHONE ENCOUNTER
Hemant to patient, called pharmacy and clarified saying the dose was temporarily increased due to break through bleeding but then was decreased once bleeding stopped so she ran out sooner.  Pharmacy approving and will get it ready for patient.  10/02/24 at 10:41 AM - Susan John RN

## 2024-10-02 NOTE — TELEPHONE ENCOUNTER
Caller:   Reason for call: Medication request  Medication requested:  Birth control  Patient call in yesterday to get med refill, and she did get the refill. But, pt stated that insurance will not refill until the 19th because it is too early. Pt wants an alternative birth control to continue care, and asking for a call back.

## 2024-11-12 ENCOUNTER — TELEMEDICINE (OUTPATIENT)
Dept: ENDOCRINOLOGY | Facility: CLINIC | Age: 34
End: 2024-11-12
Payer: COMMERCIAL

## 2024-11-12 VITALS — HEIGHT: 70 IN | WEIGHT: 293 LBS | BODY MASS INDEX: 41.95 KG/M2

## 2024-11-12 DIAGNOSIS — E28.2 PCOS (POLYCYSTIC OVARIAN SYNDROME): Primary | ICD-10-CM

## 2024-11-12 PROCEDURE — 3008F BODY MASS INDEX DOCD: CPT | Performed by: NURSE PRACTITIONER

## 2024-11-12 PROCEDURE — 99213 OFFICE O/P EST LOW 20 MIN: CPT | Performed by: NURSE PRACTITIONER

## 2024-11-12 PROCEDURE — 1036F TOBACCO NON-USER: CPT | Performed by: NURSE PRACTITIONER

## 2024-11-12 RX ORDER — NORETHINDRONE ACETATE AND ETHINYL ESTRADIOL 1.5-30(21)
KIT ORAL EVERY 24 HOURS
COMMUNITY

## 2024-11-12 RX ORDER — TIRZEPATIDE 2.5 MG/.5ML
2.5 INJECTION, SOLUTION SUBCUTANEOUS
COMMUNITY
Start: 2024-10-15

## 2024-11-12 ASSESSMENT — PATIENT HEALTH QUESTIONNAIRE - PHQ9
1. LITTLE INTEREST OR PLEASURE IN DOING THINGS: NOT AT ALL
2. FEELING DOWN, DEPRESSED OR HOPELESS: NOT AT ALL
SUM OF ALL RESPONSES TO PHQ9 QUESTIONS 1 AND 2: 0

## 2024-11-12 ASSESSMENT — PAIN SCALES - GENERAL: PAINLEVEL_OUTOF10: 0-NO PAIN

## 2024-11-12 NOTE — PROGRESS NOTES
Virtual or Telephone Consent: An interactive audio and video telecommunication system which permits real time communications between the patient (at the originating site) and provider (at the distant site) was utilized to provide this telehealth service    Follow Up Visit HPI    Patient is a 34 y.o.  female with Polycystic Ovarian Syndrome presenting today for follow up visit. She has moved to [place] since she last saw us. She does want to return for fertility treatments with us.  She is meeting with a bariatric surgery to discuss possibly having surgery.  Has established with an OBGYN and they are doing a repeat biopsy in December and placing an IUD for the hyperplasia.     Testing to date:   Result Date Done   TSH: 2.69 (Ref range: 0.44 - 3.98 mIU/L) 2024   AMH: 13.191 (H; Ref range: 0.176 - 11.705 ng/mL) 2024   PRL: 6.0 (Ref range: 3.0 - 20.0 ug/L) 2024   Testosterone: No results found for requested labs within last 365 days. No results found for requested labs within last 365 days.   DHEAS: 334 (Ref range: 12 - 379 ug/dL) 2024   Other: NA  Component      Latest Ref Rng 2024   Case Report  Surgical Pathology                                Case: [id number]                                …    FINAL DIAGNOSIS  A. Endometrium, curettage:…       By the signature on this report, the individual or group listed as making the Final Interpretation/Diagnosis certifies that they have reviewed this case.    Clinical History  concern for uterine polyp    Gross Description  A: Received in formalin, labeled with the patient´s name and hospital number and ``[facility]´´, are multiple fragments of mucus, blood, and soft tissue, aggregating to 5.0 x 2.0 x 0.7 cm.  The specimen is submitted in toto in two cassettes. …    Testosterone, Free      0.1 - 6.4 pg/mL 6.0     Testosterone, Total, LC-MS/MS      2 - 45 ng/dL 38     Varicella Zoster, IgG      Negative  Positive !     Varicella Zoster, IgG  "Index      <=0.8 IA 5.3 (H)     Rubella, IgG      Negative  Positive     Rubella, IgG Index      <=0.7 IA IA 2.1     Anti-Mullerian Hormone      0.176 - 11.705 ng/mL 13.191 (H)     PROLACTIN      3.0 - 20.0 ug/L 6.0     DHEA Sulfate      12 - 379 ug/dL 334     17-Hydroxyprogesterone      <=206.00 ng/dL 16.44     Progesterone      ng/mL 0.5     Preg Test, Ur      Negative   Negative       Legend:  ! Abnormal  (H) High  Hysterosalpingogram: NA  Saline Infused Sonography: NA  GYN Pelvic Ultrasound: US PELVIS TRANSVAGINAL (2024):     Partner SA: Azoospermia and previously treated for cancer. Has frozen vials- will confirm status with Dr. Gonzales    Treatment to date: None      Past Medical History:   Diagnosis Date    Polycystic ovary syndrome      Past Surgical History:   Procedure Laterality Date    GALLBLADDER SURGERY  2023     Current Outpatient Medications on File Prior to Visit   Medication Sig Dispense Refill    loratadine (Claritin) 10 mg tablet Take 1 tablet (10 mg) by mouth once daily.      MULTIVITAMIN ORAL Take 1 tablet by mouth once daily.      norethindrone (Aygestin) 5 mg tablet Take 1 tablet (5 mg) by mouth once daily. 90 tablet 3    norethindrone-e.estradioL-iron (Junel FE .,) 1.5 mg-30 mcg (21)/75 mg (7) tablet Take by mouth once every 24 hours.      Zepbound 2.5 mg/0.5 mL injection Inject 2.5 mg under the skin every 7 days.      semaglutide, weight loss, (Wegovy) 0.5 mg/0.5 mL pen injector Inject 0.5 mg under the skin 1 (one) time per week. (Patient not taking: Reported on 2024) 2 mL 3     No current facility-administered medications on file prior to visit.       BMI:   BMI Readings from Last 1 Encounters:   24 54.99 kg/m²     VITALS:  Ht 1.778 m (5' 10\")   Wt (!) 174 kg (383 lb 4 oz)   BMI 54.99 kg/m²   LMP: No LMP recorded.    ASSESSMENT   34 y.o.  female with primary infertility x 2 years, Polycystic Ovarian Syndrome and the following pertinent medical " issues: obesity, AUB, hyperplasia, azospermia .    COUNSELING  Reviewed results in detail. Reviewed BMI cut off.     Routine Testing  Fertility Center  STDs Within 1 year   Genetic carrier Waiver/Completed   T&S Within 1 year   AMH Within 1 year   TSH Within 1 year   Rubella/Varicella Within 5 years     BMI Testing  Fertility Center  CBC Within 1 year   CMP Within 1 year   HgbA1c Within 1 year   Mag, Phos, Vit D <18 Within 1 year   MFM > 40  REQ   Wt loss consult > 40 OPT     PLAN  No orders of the defined types were placed in this encounter.      FOLLOW UP   Consults:  TBD once weight loss complete. May still need MFM if BMI >40/45. Will continue with seeing OBGYN locally in [place] and meeting with weight loss surgeons.  Engaged MD  Take prenatal vitamins, vitamin D 2000 IUs daily  Discussed that treatment cannot proceed until checklist items are complete.   Additional testing for BMI < 18 or > 40: Yes.  Patient to schedule follow up visit when ready to proceed with fertility treatments. Advised patient to arrange this now with the .  Chart to primary nurse for care coordination and patient check list/education.    MD Completion:  Ectopic Risk: No  Medically Complex: Yes  Outstanding boarding pass items: TBD    Fertility Plan Update:    Tana JOSÉ MIGUEL Gross  11/12/2024  3:46 PM